# Patient Record
Sex: FEMALE | Employment: UNEMPLOYED | URBAN - METROPOLITAN AREA
[De-identification: names, ages, dates, MRNs, and addresses within clinical notes are randomized per-mention and may not be internally consistent; named-entity substitution may affect disease eponyms.]

---

## 2024-07-12 ENCOUNTER — APPOINTMENT (OUTPATIENT)
Dept: GASTROENTEROLOGY | Facility: HOSPITAL | Age: 64
End: 2024-07-12
Payer: MEDICARE

## 2024-10-10 ENCOUNTER — ANESTHESIA EVENT (OUTPATIENT)
Dept: GASTROENTEROLOGY | Facility: HOSPITAL | Age: 64
End: 2024-10-10

## 2024-10-10 PROBLEM — I21.9 MI (MYOCARDIAL INFARCTION) (MULTI): Status: ACTIVE | Noted: 2024-10-10

## 2024-10-10 PROBLEM — I25.2 HISTORY OF MI (MYOCARDIAL INFARCTION): Status: ACTIVE | Noted: 2024-04-04

## 2024-10-10 PROBLEM — M54.12 CERVICAL RADICULOPATHY: Status: ACTIVE | Noted: 2017-07-26

## 2024-10-10 PROBLEM — G45.9 TRANSIENT CEREBRAL ISCHEMIA: Status: ACTIVE | Noted: 2017-05-25

## 2024-10-10 PROBLEM — J45.909 ASTHMA: Status: ACTIVE | Noted: 2020-10-09

## 2024-10-10 PROBLEM — I49.3 PVC (PREMATURE VENTRICULAR CONTRACTION): Status: ACTIVE | Noted: 2018-03-19

## 2024-10-10 PROBLEM — E78.5 DYSLIPIDEMIA: Status: ACTIVE | Noted: 2024-10-10

## 2024-10-10 PROBLEM — I10 HYPERTENSION: Status: ACTIVE | Noted: 2024-01-10

## 2024-10-10 NOTE — ANESTHESIA PREPROCEDURE EVALUATION
Patient: Lana Cummings    Procedure Information       Date/Time: 10/11/24 1040    Scheduled providers: Bernardino Pederson MD    Procedure: EGD    Location: [facility]            Relevant Problems   Cardiac   (+) Hypertension   (+) MI (myocardial infarction) (Multi) (  2/2 an air embolism during coronary angiography (12/2021)    )   (+) PVC (premature ventricular contraction)      Pulmonary   (+) Asthma   (+) Mild intermittent asthma without complication ([facility])   (+) Obstructive sleep apnea (adult) (pediatric)      Neuro   (+) Cervical radiculopathy      GI   (+) Esophageal reflux      Circulatory   (+) Cardiac microvascular disease   (+) Transient cerebral ischemia      Cardiac and Vasculature   (+) Dyslipidemia   (+) History of MI (myocardial infarction)       Clinical information reviewed:                   NPO Detail:  No data recorded     PHYSICAL EXAM    Anesthesia Plan

## 2024-10-11 ENCOUNTER — ANESTHESIA (OUTPATIENT)
Dept: GASTROENTEROLOGY | Facility: HOSPITAL | Age: 64
End: 2024-10-11
Payer: MEDICARE